# Patient Record
Sex: MALE | Race: WHITE | Employment: STUDENT | URBAN - METROPOLITAN AREA
[De-identification: names, ages, dates, MRNs, and addresses within clinical notes are randomized per-mention and may not be internally consistent; named-entity substitution may affect disease eponyms.]

---

## 2017-06-06 ENCOUNTER — LAB ENCOUNTER (OUTPATIENT)
Dept: LAB | Age: 22
End: 2017-06-06
Attending: FAMILY MEDICINE
Payer: COMMERCIAL

## 2017-06-06 ENCOUNTER — OFFICE VISIT (OUTPATIENT)
Dept: FAMILY MEDICINE CLINIC | Facility: CLINIC | Age: 22
End: 2017-06-06

## 2017-06-06 VITALS
HEART RATE: 72 BPM | BODY MASS INDEX: 27.77 KG/M2 | SYSTOLIC BLOOD PRESSURE: 118 MMHG | HEIGHT: 70.6 IN | DIASTOLIC BLOOD PRESSURE: 76 MMHG | RESPIRATION RATE: 14 BRPM | WEIGHT: 196.19 LBS | TEMPERATURE: 98 F

## 2017-06-06 DIAGNOSIS — R53.83 FATIGUE, UNSPECIFIED TYPE: Primary | ICD-10-CM

## 2017-06-06 DIAGNOSIS — R53.83 FATIGUE, UNSPECIFIED TYPE: ICD-10-CM

## 2017-06-06 PROCEDURE — 36415 COLL VENOUS BLD VENIPUNCTURE: CPT

## 2017-06-06 PROCEDURE — 82306 VITAMIN D 25 HYDROXY: CPT

## 2017-06-06 PROCEDURE — 99203 OFFICE O/P NEW LOW 30 MIN: CPT | Performed by: FAMILY MEDICINE

## 2017-06-06 PROCEDURE — 84443 ASSAY THYROID STIM HORMONE: CPT

## 2017-06-06 PROCEDURE — 80053 COMPREHEN METABOLIC PANEL: CPT

## 2017-06-06 PROCEDURE — 85025 COMPLETE CBC W/AUTO DIFF WBC: CPT

## 2017-06-06 NOTE — PROGRESS NOTES
Patient presents with:  Establish Care  Fatigue: random for the past year- some short term memory issues- brain feels cloudy at times- even though gets good rest  Physical: patient is fasting     HPI:   Kim Mejia is a 23year old male who presents Laterality Date   • Other       wisdom teeth extraction   • Other       nose - septoplasty     Family History   Problem Relation Age of Onset   • Seizure Disorder Father    • Lipids Father    • Hypertension Mother    • Migraines Mother    • Heart Attack Ma symmetric reflexes. Normal coordination and gait     ASSESSMENT AND PLAN:     Marquis Camp was seen in the office today:  had concerns including Establish Care;  Fatigue; and Physical.    1. Fatigue, unspecified type  New patient with this sort of chr

## 2017-11-15 ENCOUNTER — OFFICE VISIT (OUTPATIENT)
Dept: FAMILY MEDICINE CLINIC | Facility: CLINIC | Age: 22
End: 2017-11-15

## 2017-11-15 ENCOUNTER — APPOINTMENT (OUTPATIENT)
Dept: LAB | Age: 22
End: 2017-11-15
Attending: FAMILY MEDICINE
Payer: COMMERCIAL

## 2017-11-15 ENCOUNTER — TELEPHONE (OUTPATIENT)
Dept: FAMILY MEDICINE CLINIC | Facility: CLINIC | Age: 22
End: 2017-11-15

## 2017-11-15 VITALS
SYSTOLIC BLOOD PRESSURE: 130 MMHG | RESPIRATION RATE: 14 BRPM | TEMPERATURE: 98 F | HEIGHT: 70 IN | WEIGHT: 199 LBS | HEART RATE: 76 BPM | DIASTOLIC BLOOD PRESSURE: 72 MMHG | BODY MASS INDEX: 28.49 KG/M2

## 2017-11-15 DIAGNOSIS — G47.33 OBSTRUCTIVE SLEEP APNEA: ICD-10-CM

## 2017-11-15 DIAGNOSIS — F32.1 MODERATE MAJOR DEPRESSION, SINGLE EPISODE (HCC): ICD-10-CM

## 2017-11-15 DIAGNOSIS — R53.83 FATIGUE, UNSPECIFIED TYPE: ICD-10-CM

## 2017-11-15 DIAGNOSIS — R53.83 FATIGUE, UNSPECIFIED TYPE: Primary | ICD-10-CM

## 2017-11-15 PROCEDURE — 36415 COLL VENOUS BLD VENIPUNCTURE: CPT

## 2017-11-15 PROCEDURE — 84403 ASSAY OF TOTAL TESTOSTERONE: CPT

## 2017-11-15 PROCEDURE — 99214 OFFICE O/P EST MOD 30 MIN: CPT | Performed by: FAMILY MEDICINE

## 2017-11-15 RX ORDER — CHOLECALCIFEROL (VITAMIN D3) 50 MCG
TABLET ORAL
COMMUNITY
End: 2020-12-23

## 2017-11-15 NOTE — PROGRESS NOTES
Patient presents with:  Fatigue: Pt states gets 8-9 hours alseep and still wakes up tired. Pt states he has sleep apnea and stillwaking up at night.        HPI:   This is a 23year old male coming in for fatiguew all the time, hx NIKA, had septoplasty in [address] 06/06/2017 09:31 AM   AST 24 06/06/2017 09:31 AM   ALT 44 06/06/2017 09:31 AM   BILT 0.6 06/06/2017 09:31 AM   TP 7.5 06/06/2017 09:31 AM   ALB 4.2 06/06/2017 09:31 AM    06/06/2017 09:31 AM   K 4.4 06/06/2017 09:31 AM    06/06/2017 Neurological: He is alert and oriented to person, place, and time. He has normal strength. Skin: Skin is warm and dry. No rash noted. Psychiatric: He has a normal mood and affect.  His speech is normal and behavior is normal. Judgment and thought cont

## 2017-12-06 ENCOUNTER — APPOINTMENT (OUTPATIENT)
Dept: SLEEP CENTER | Facility: HOSPITAL | Age: 22
End: 2017-12-06
Attending: FAMILY MEDICINE
Payer: COMMERCIAL

## 2018-03-05 ENCOUNTER — TELEPHONE (OUTPATIENT)
Dept: FAMILY MEDICINE CLINIC | Facility: CLINIC | Age: 23
End: 2018-03-05

## 2018-03-05 ENCOUNTER — OFFICE VISIT (OUTPATIENT)
Dept: FAMILY MEDICINE CLINIC | Facility: CLINIC | Age: 23
End: 2018-03-05

## 2018-03-05 VITALS
BODY MASS INDEX: 29.63 KG/M2 | SYSTOLIC BLOOD PRESSURE: 120 MMHG | DIASTOLIC BLOOD PRESSURE: 60 MMHG | RESPIRATION RATE: 14 BRPM | TEMPERATURE: 98 F | HEIGHT: 69.8 IN | HEART RATE: 84 BPM | WEIGHT: 204.63 LBS

## 2018-03-05 DIAGNOSIS — R06.83 SNORING: ICD-10-CM

## 2018-03-05 DIAGNOSIS — S61.231A PUNCTURE WOUND OF LEFT INDEX FINGER: Primary | ICD-10-CM

## 2018-03-05 PROCEDURE — 99213 OFFICE O/P EST LOW 20 MIN: CPT | Performed by: PHYSICIAN ASSISTANT

## 2018-03-05 PROCEDURE — 90471 IMMUNIZATION ADMIN: CPT | Performed by: PHYSICIAN ASSISTANT

## 2018-03-05 PROCEDURE — 90715 TDAP VACCINE 7 YRS/> IM: CPT | Performed by: PHYSICIAN ASSISTANT

## 2018-03-05 NOTE — PROGRESS NOTES
CC:  Patient presents with:  Trauma (cardiovascular, musculoskeletal): Liftina a couch yesterday and staple went through left second finger. Requesting a tetanus vaccine. HPI: Dk Cruz presents for a puncture wound to the left distal index finger.  He w distress  HENT:  Head: Normocephalic, atraumatic  Ears: Normal external ears  Nose: No edema, bleeding, or rhinorrhea  Eyes: Pupils equal  Cardiovascular: Excellent peripheral perfusion  Pulmonary/Chest: No audible wheezing or labored breathing  Abdominal:

## 2019-11-20 ENCOUNTER — TELEPHONE (OUTPATIENT)
Dept: FAMILY MEDICINE CLINIC | Facility: CLINIC | Age: 24
End: 2019-11-20

## 2019-11-20 DIAGNOSIS — Z13.220 LIPID SCREENING: ICD-10-CM

## 2019-11-20 DIAGNOSIS — Z00.00 LABORATORY EXAM ORDERED AS PART OF ROUTINE GENERAL MEDICAL EXAMINATION: Primary | ICD-10-CM

## 2019-11-20 NOTE — TELEPHONE ENCOUNTER
Please enter lab orders for the patient's upcoming physical appointment. Please route back to . Physical scheduled:    Your appointments     Date & Time Appointment Department [facility])    Nov 26, 2019  8:30 AM CST Physical - Established wi

## 2019-11-26 ENCOUNTER — OFFICE VISIT (OUTPATIENT)
Dept: FAMILY MEDICINE CLINIC | Facility: CLINIC | Age: 24
End: 2019-11-26
Payer: COMMERCIAL

## 2019-11-26 VITALS
SYSTOLIC BLOOD PRESSURE: 108 MMHG | TEMPERATURE: 98 F | HEIGHT: 70.25 IN | DIASTOLIC BLOOD PRESSURE: 66 MMHG | RESPIRATION RATE: 14 BRPM | BODY MASS INDEX: 28.06 KG/M2 | WEIGHT: 196 LBS | HEART RATE: 64 BPM

## 2019-11-26 DIAGNOSIS — Z00.00 ANNUAL PHYSICAL EXAM: Primary | ICD-10-CM

## 2019-11-26 DIAGNOSIS — Z13.220 LIPID SCREENING: ICD-10-CM

## 2019-11-26 DIAGNOSIS — Z13.21 ENCOUNTER FOR VITAMIN DEFICIENCY SCREENING: ICD-10-CM

## 2019-11-26 PROCEDURE — 99395 PREV VISIT EST AGE 18-39: CPT | Performed by: FAMILY MEDICINE

## 2019-11-26 NOTE — PROGRESS NOTES
Patient presents with: Well Adult: Annual physical     HPI:   Roman Rubin is a 27year old male who presents for a complete physical exam. Feeling well, no concerns.       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    - VITAMIN B12  - VITAMIN D, 25-HYDROXY        RTC annually      Geeta Villegas M.D.   EMG 3  11/26/19

## 2020-02-24 ENCOUNTER — OFFICE VISIT (OUTPATIENT)
Dept: FAMILY MEDICINE CLINIC | Facility: CLINIC | Age: 25
End: 2020-02-24
Payer: COMMERCIAL

## 2020-02-24 VITALS
RESPIRATION RATE: 16 BRPM | HEIGHT: 71 IN | TEMPERATURE: 99 F | WEIGHT: 202.5 LBS | OXYGEN SATURATION: 99 % | SYSTOLIC BLOOD PRESSURE: 128 MMHG | DIASTOLIC BLOOD PRESSURE: 74 MMHG | BODY MASS INDEX: 28.35 KG/M2 | HEART RATE: 90 BPM

## 2020-02-24 DIAGNOSIS — J06.9 ACUTE URI: Primary | ICD-10-CM

## 2020-02-24 PROCEDURE — 99213 OFFICE O/P EST LOW 20 MIN: CPT | Performed by: NURSE PRACTITIONER

## 2020-02-25 NOTE — PATIENT INSTRUCTIONS
Gargle with warm salt water solution 3-5 times daily. Dissolve 1/2 teaspoon salt in half cup of warm tap water. Gargle and spit.      Try a premixed saline nasal spray, available over the counter, such as Harrison Nasal Spray (or generic equi

## 2020-02-25 NOTE — PROGRESS NOTES
Patient presents with:  Cough: x 1 day, weak, lightheaded, headach, chills       HPI:  Presents with 1 day history of chills (did not check temperatures), weakness, mild lightheadedness, cough with production of whitish colored sputum, mild sore throat, li dry. No rash noted. No erythema. No pallor. A/P:    Acute uri  (primary encounter diagnosis)- symptom profile consistent with influenza but no fevers, so I am hard pressed to call this influenza.  Discussed supportive care with fluids, rest. Note provid or in between full sinus washes.              All questions were answered and the patient understands the plan.     '

## 2020-09-30 ENCOUNTER — TELEPHONE (OUTPATIENT)
Dept: FAMILY MEDICINE CLINIC | Facility: CLINIC | Age: 25
End: 2020-09-30

## 2020-09-30 DIAGNOSIS — Z13.29 SCREENING FOR ENDOCRINE, METABOLIC AND IMMUNITY DISORDER: ICD-10-CM

## 2020-09-30 DIAGNOSIS — Z13.21 ENCOUNTER FOR VITAMIN DEFICIENCY SCREENING: ICD-10-CM

## 2020-09-30 DIAGNOSIS — Z13.220 SCREENING, LIPID: ICD-10-CM

## 2020-09-30 DIAGNOSIS — Z13.0 SCREENING, ANEMIA, DEFICIENCY, IRON: Primary | ICD-10-CM

## 2020-09-30 DIAGNOSIS — Z13.228 SCREENING FOR ENDOCRINE, METABOLIC AND IMMUNITY DISORDER: ICD-10-CM

## 2020-09-30 DIAGNOSIS — Z13.0 SCREENING FOR ENDOCRINE, METABOLIC AND IMMUNITY DISORDER: ICD-10-CM

## 2020-10-27 ENCOUNTER — LAB ENCOUNTER (OUTPATIENT)
Dept: LAB | Age: 25
End: 2020-10-27
Attending: FAMILY MEDICINE
Payer: COMMERCIAL

## 2020-10-27 DIAGNOSIS — Z13.0 SCREENING FOR ENDOCRINE, METABOLIC AND IMMUNITY DISORDER: ICD-10-CM

## 2020-10-27 DIAGNOSIS — Z13.228 SCREENING FOR ENDOCRINE, METABOLIC AND IMMUNITY DISORDER: ICD-10-CM

## 2020-10-27 DIAGNOSIS — Z13.220 SCREENING, LIPID: ICD-10-CM

## 2020-10-27 DIAGNOSIS — Z13.21 ENCOUNTER FOR VITAMIN DEFICIENCY SCREENING: ICD-10-CM

## 2020-10-27 DIAGNOSIS — Z13.29 SCREENING FOR ENDOCRINE, METABOLIC AND IMMUNITY DISORDER: ICD-10-CM

## 2020-10-27 DIAGNOSIS — Z13.0 SCREENING, ANEMIA, DEFICIENCY, IRON: ICD-10-CM

## 2020-10-27 PROCEDURE — 85025 COMPLETE CBC W/AUTO DIFF WBC: CPT

## 2020-10-27 PROCEDURE — 36415 COLL VENOUS BLD VENIPUNCTURE: CPT

## 2020-12-23 ENCOUNTER — OFFICE VISIT (OUTPATIENT)
Dept: FAMILY MEDICINE CLINIC | Facility: CLINIC | Age: 25
End: 2020-12-23
Payer: COMMERCIAL

## 2020-12-23 VITALS
DIASTOLIC BLOOD PRESSURE: 70 MMHG | SYSTOLIC BLOOD PRESSURE: 124 MMHG | WEIGHT: 208 LBS | HEIGHT: 70 IN | TEMPERATURE: 99 F | RESPIRATION RATE: 16 BRPM | HEART RATE: 82 BPM | BODY MASS INDEX: 29.78 KG/M2

## 2020-12-23 DIAGNOSIS — Z00.00 ANNUAL PHYSICAL EXAM: Primary | ICD-10-CM

## 2020-12-23 PROCEDURE — 3008F BODY MASS INDEX DOCD: CPT | Performed by: FAMILY MEDICINE

## 2020-12-23 PROCEDURE — 99395 PREV VISIT EST AGE 18-39: CPT | Performed by: FAMILY MEDICINE

## 2020-12-23 PROCEDURE — 3078F DIAST BP <80 MM HG: CPT | Performed by: FAMILY MEDICINE

## 2020-12-23 PROCEDURE — 3074F SYST BP LT 130 MM HG: CPT | Performed by: FAMILY MEDICINE

## 2020-12-23 NOTE — PROGRESS NOTES
Raven Chanel is a 23year old male who presents for a complete physical exam.   HPI:   Patient presents with:  Physical    His last annual assessment has been over 1 year: Annual Physical due on 11/26/2020       Pt complains of doing well, no new iss Heart Attack in his maternal grandfather; Hypertension in his mother; Lipids in his father; Migraines in his mother; Seizure Disorder in his father. He is not on any long-term medications. He has No Known Allergies.    He  reports that he has never sm normal, S2 normal, normal heart sounds and intact distal pulses. Exam reveals no gallop, no S3, no S4 and no friction rub. No murmur heard. Edema not present. Carotid bruit not present.   Pulmonary/Chest: Effort normal and breath sounds normal. No resp 10/22/2018   • HEP B 02/28/1997, 04/28/1997, 09/29/1997   • HIB 03/29/1997, 05/27/1997, 07/28/1997, 05/06/1998   • Hpv Virus Vaccine 9 Basilia Im 03/17/2016, 06/03/2016, 10/14/2016   • IPV 03/29/1997, 05/27/1997, 07/28/1997, 07/31/1998, 05/22/2002   • Influenz

## 2021-02-09 ENCOUNTER — TELEPHONE (OUTPATIENT)
Dept: FAMILY MEDICINE CLINIC | Facility: CLINIC | Age: 26
End: 2021-02-09

## 2021-02-15 ENCOUNTER — NURSE ONLY (OUTPATIENT)
Dept: FAMILY MEDICINE CLINIC | Facility: CLINIC | Age: 26
End: 2021-02-15
Payer: COMMERCIAL

## 2021-02-15 VITALS — TEMPERATURE: 98 F

## 2021-02-15 DIAGNOSIS — Z11.1 SCREENING FOR TUBERCULOSIS: Primary | ICD-10-CM

## 2021-02-15 PROCEDURE — 86580 TB INTRADERMAL TEST: CPT | Performed by: FAMILY MEDICINE

## 2021-02-15 NOTE — PROGRESS NOTES
Patient is here today for a TB test.  This is required for his employer. The TB test is administered into the left forearm. Patient is to return in 50 to 72 hours to have the test read.   Patient voiced understanding

## 2021-02-18 ENCOUNTER — NURSE ONLY (OUTPATIENT)
Dept: FAMILY MEDICINE CLINIC | Facility: CLINIC | Age: 26
End: 2021-02-18
Payer: COMMERCIAL

## 2021-02-18 VITALS — TEMPERATURE: 98 F

## 2021-02-18 DIAGNOSIS — Z11.1 SCREENING FOR TUBERCULOSIS: Primary | ICD-10-CM

## 2021-02-18 NOTE — PROGRESS NOTES
Patient is here for TB read back. The test was administered on 2/15/2021 and read today 2/18/21 within the window of 48 to 72 hours. The test is read as 0 mm negative.   Patient is given a copy of his immunizations

## 2021-03-15 DIAGNOSIS — Z23 NEED FOR VACCINATION: ICD-10-CM

## 2021-04-10 ENCOUNTER — MOBILE ENCOUNTER (OUTPATIENT)
Dept: FAMILY MEDICINE CLINIC | Facility: CLINIC | Age: 26
End: 2021-04-10

## 2021-04-11 NOTE — PROGRESS NOTES
Patient started having symptoms on Wednesday, 3 days ago. Tested positive at work on Thursday for COVID-19. He had several exposures while wearing his mask and is concerned.  He has minimal symptoms at this time including a loss of smell and taste and mild

## 2021-04-13 ENCOUNTER — TELEPHONE (OUTPATIENT)
Dept: FAMILY MEDICINE CLINIC | Facility: CLINIC | Age: 26
End: 2021-04-13

## 2021-04-13 NOTE — TELEPHONE ENCOUNTER
Called nasima talked to patient told him this would not be a good idea as they could keep reinfecting each other so he will stay away for now.

## 2021-04-13 NOTE — TELEPHONE ENCOUNTER
Pt tested positive on 4/8/21 and girlfriend on 04/13/21. Pt would like to know if he is able to quarantine with her so they don't expose her father? Please advise.

## 2021-04-19 ENCOUNTER — TELEPHONE (OUTPATIENT)
Dept: FAMILY MEDICINE CLINIC | Facility: CLINIC | Age: 26
End: 2021-04-19

## 2021-04-19 NOTE — TELEPHONE ENCOUNTER
Pt states he was diagnosed with COVID on 04/08/21 and he is still having a cough and a little congestion. Pt wants to know if he should quarantine pass his 14 day mahendra or is there medication he can take to get rid of his cough.  Advised on video visit christian gallegos

## 2021-04-21 ENCOUNTER — TELEMEDICINE (OUTPATIENT)
Dept: FAMILY MEDICINE CLINIC | Facility: CLINIC | Age: 26
End: 2021-04-21

## 2021-04-21 DIAGNOSIS — U07.1 COVID-19 VIRUS INFECTION: Primary | ICD-10-CM

## 2021-04-21 DIAGNOSIS — R05.9 COUGH: ICD-10-CM

## 2021-04-21 PROCEDURE — 99213 OFFICE O/P EST LOW 20 MIN: CPT | Performed by: NURSE PRACTITIONER

## 2021-04-21 RX ORDER — ALBUTEROL SULFATE 90 UG/1
2 AEROSOL, METERED RESPIRATORY (INHALATION) EVERY 4 HOURS PRN
Qty: 1 INHALER | Refills: 1 | Status: SHIPPED | OUTPATIENT
Start: 2021-04-21 | End: 2021-10-05

## 2021-04-21 NOTE — PATIENT INSTRUCTIONS
Use inhaler (as instructed below) as needed for coughing or shortness of breath. Be sure to space doses at least 4 hours apart. Inhaler should be primed before first use only by pressing inhaler down once or twice without inhaler your mouth.

## 2021-04-21 NOTE — PROGRESS NOTES
Patient presents with:  Covid: covid follow started 4/8      This visit was conducted using Telemedicine with live, interactive video and audio.     Patient understands and accepts financial responsibility for any deductible, co-insurance and/or co-pays ass sentences. No need to pause speaking to take breaths, no deep/exaggerated breaths or coughing noted during conversation.        Skin: warm and pink w/o flushing or obvious diaphoresis     Psych: pleasant and cooperative on video, speech pattern normal. HFA (PROAIR HFA) 108 (90 Base) MCG/ACT Inhalation Aero Soln 1 Inhaler 1     Sig: Inhale 2 puffs into the lungs every 4 (four) hours as needed for Wheezing or Shortness of Breath (coughing). Imaging & Consults:  None    No follow-ups on file.   Patient

## 2021-04-22 ENCOUNTER — TELEPHONE (OUTPATIENT)
Dept: FAMILY MEDICINE CLINIC | Facility: CLINIC | Age: 26
End: 2021-04-22

## 2021-04-22 NOTE — TELEPHONE ENCOUNTER
I would say up to them. I typically use a 10-14 day range, so as long as they are having no symptoms should be fine.

## 2021-04-22 NOTE — TELEPHONE ENCOUNTER
Called and talked to patient he is almost 14 days clear and the girl friend is 13 days they will discuss this

## 2021-04-22 NOTE — TELEPHONE ENCOUNTER
Patient is calling he wants to know if it is safe to continue to see girl friend now with out masking they are both past the ten days both had Covid.

## 2021-08-16 ENCOUNTER — TELEPHONE (OUTPATIENT)
Dept: FAMILY MEDICINE CLINIC | Facility: CLINIC | Age: 26
End: 2021-08-16

## 2021-08-16 NOTE — TELEPHONE ENCOUNTER
Spoke to pt and explained he will need to schedule a video visit. Pt said it was not urgent and will call back to schedule.

## 2022-02-07 ENCOUNTER — OFFICE VISIT (OUTPATIENT)
Dept: FAMILY MEDICINE CLINIC | Facility: CLINIC | Age: 27
End: 2022-02-07
Payer: COMMERCIAL

## 2022-02-07 VITALS
SYSTOLIC BLOOD PRESSURE: 130 MMHG | HEART RATE: 82 BPM | BODY MASS INDEX: 30.1 KG/M2 | DIASTOLIC BLOOD PRESSURE: 70 MMHG | WEIGHT: 215 LBS | HEIGHT: 71 IN | RESPIRATION RATE: 18 BRPM

## 2022-02-07 DIAGNOSIS — K64.0 GRADE I HEMORRHOIDS: ICD-10-CM

## 2022-02-07 DIAGNOSIS — M72.2 PLANTAR FASCIITIS: Primary | ICD-10-CM

## 2022-02-07 PROCEDURE — 3075F SYST BP GE 130 - 139MM HG: CPT | Performed by: FAMILY MEDICINE

## 2022-02-07 PROCEDURE — 3008F BODY MASS INDEX DOCD: CPT | Performed by: FAMILY MEDICINE

## 2022-02-07 PROCEDURE — 17110 DESTRUCTION B9 LES UP TO 14: CPT | Performed by: FAMILY MEDICINE

## 2022-02-07 PROCEDURE — 99213 OFFICE O/P EST LOW 20 MIN: CPT | Performed by: FAMILY MEDICINE

## 2022-02-07 PROCEDURE — 3078F DIAST BP <80 MM HG: CPT | Performed by: FAMILY MEDICINE

## 2022-04-11 ENCOUNTER — TELEPHONE (OUTPATIENT)
Dept: FAMILY MEDICINE CLINIC | Facility: CLINIC | Age: 27
End: 2022-04-11

## 2022-11-14 ENCOUNTER — TELEPHONE (OUTPATIENT)
Dept: FAMILY MEDICINE CLINIC | Facility: CLINIC | Age: 27
End: 2022-11-14

## 2022-11-14 DIAGNOSIS — Z00.00 LABORATORY EXAMINATION ORDERED AS PART OF A ROUTINE GENERAL MEDICAL EXAMINATION: Primary | ICD-10-CM

## 2022-11-14 NOTE — TELEPHONE ENCOUNTER
Please enter lab orders for the patient's upcoming physical appointment. Physical scheduled: Your appointments     Date & Time Appointment Department [facility])    Dec 23, 2022 10:00 AM CST Adult Physical with Shelly Dykes  [address]  ([address])    PLEASE NOTE - Most insurances allow a Complete Physical once every 366 days. Please schedule accordingly. Please arrive 15 minutes prior to your scheduled appointment. Please also bring your Insurance card, Photo ID, and your medication bottles or a list of your current medication. If you no longer require this appointment, please contact your physician office to cancel. [place] [place] [address] [phone]         [facility])     The patient has been notified to complete fasting labs prior to their physical appointment.

## 2022-11-14 NOTE — TELEPHONE ENCOUNTER
1. Laboratory examination ordered as part of a routine general medical examination (Primary)  -     Comp Metabolic Panel (14); Future; Expected date: 11/14/2022  -     Lipid Panel; Future; Expected date: 11/14/2022  -     CBC, Platelet; No Differential; Future; Expected date: 11/14/2022  -     TSH W Reflex To Free T4; Future; Expected date: 11/14/2022       OK to notify.  Thanks, Cyril Chu MD

## 2022-12-27 ENCOUNTER — LAB ENCOUNTER (OUTPATIENT)
Dept: LAB | Facility: HOSPITAL | Age: 27
End: 2022-12-27
Attending: FAMILY MEDICINE
Payer: COMMERCIAL

## 2022-12-27 DIAGNOSIS — Z00.00 LABORATORY EXAMINATION ORDERED AS PART OF A ROUTINE GENERAL MEDICAL EXAMINATION: ICD-10-CM

## 2022-12-27 LAB
CHOLEST SERPL-MCNC: 177 MG/DL (ref ?–200)
ERYTHROCYTE [DISTWIDTH] IN BLOOD BY AUTOMATED COUNT: 12.4 %
FASTING PATIENT LIPID ANSWER: YES
HCT VFR BLD AUTO: 47.3 %
HDLC SERPL-MCNC: 45 MG/DL (ref 40–59)
HGB BLD-MCNC: 16 G/DL
LDLC SERPL CALC-MCNC: 114 MG/DL (ref ?–100)
MCH RBC QN AUTO: 28.6 PG (ref 26–34)
MCHC RBC AUTO-ENTMCNC: 33.8 G/DL (ref 31–37)
MCV RBC AUTO: 84.5 FL
NONHDLC SERPL-MCNC: 132 MG/DL (ref ?–130)
PLATELET # BLD AUTO: 232 10(3)UL (ref 150–450)
RBC # BLD AUTO: 5.6 X10(6)UL
TRIGL SERPL-MCNC: 101 MG/DL (ref 30–149)
TSI SER-ACNC: 1.72 MIU/ML (ref 0.36–3.74)
VLDLC SERPL CALC-MCNC: 17 MG/DL (ref 0–30)
WBC # BLD AUTO: 6.4 X10(3) UL (ref 4–11)

## 2022-12-27 PROCEDURE — 85027 COMPLETE CBC AUTOMATED: CPT

## 2022-12-27 PROCEDURE — 84443 ASSAY THYROID STIM HORMONE: CPT

## 2022-12-27 PROCEDURE — 36415 COLL VENOUS BLD VENIPUNCTURE: CPT

## 2022-12-27 PROCEDURE — 80061 LIPID PANEL: CPT

## 2023-02-08 ENCOUNTER — OFFICE VISIT (OUTPATIENT)
Dept: FAMILY MEDICINE CLINIC | Facility: CLINIC | Age: 28
End: 2023-02-08
Payer: COMMERCIAL

## 2023-02-08 VITALS
DIASTOLIC BLOOD PRESSURE: 80 MMHG | WEIGHT: 217 LBS | SYSTOLIC BLOOD PRESSURE: 130 MMHG | HEIGHT: 71 IN | BODY MASS INDEX: 30.38 KG/M2 | RESPIRATION RATE: 16 BRPM | HEART RATE: 80 BPM

## 2023-02-08 DIAGNOSIS — Z00.00 ANNUAL PHYSICAL EXAM: Primary | ICD-10-CM

## 2023-02-08 DIAGNOSIS — R06.83 SNORING: ICD-10-CM

## 2023-02-08 PROCEDURE — 3079F DIAST BP 80-89 MM HG: CPT | Performed by: FAMILY MEDICINE

## 2023-02-08 PROCEDURE — 99395 PREV VISIT EST AGE 18-39: CPT | Performed by: FAMILY MEDICINE

## 2023-02-08 PROCEDURE — 3075F SYST BP GE 130 - 139MM HG: CPT | Performed by: FAMILY MEDICINE

## 2023-02-08 PROCEDURE — 3008F BODY MASS INDEX DOCD: CPT | Performed by: FAMILY MEDICINE

## 2023-09-14 ENCOUNTER — TELEPHONE (OUTPATIENT)
Dept: FAMILY MEDICINE CLINIC | Facility: CLINIC | Age: 28
End: 2023-09-14

## 2023-09-14 DIAGNOSIS — Z00.00 LABORATORY EXAMINATION ORDERED AS PART OF A ROUTINE GENERAL MEDICAL EXAMINATION: ICD-10-CM

## 2023-09-14 DIAGNOSIS — Z13.1 SCREENING FOR DIABETES MELLITUS: Primary | ICD-10-CM

## 2023-09-14 DIAGNOSIS — Z13.6 SCREENING FOR CARDIOVASCULAR CONDITION: ICD-10-CM

## 2023-09-14 DIAGNOSIS — Z79.899 LONG-TERM USE OF HIGH-RISK MEDICATION: ICD-10-CM

## 2023-09-14 DIAGNOSIS — Z13.0 SCREENING FOR IRON DEFICIENCY ANEMIA: ICD-10-CM

## 2023-09-14 DIAGNOSIS — Z11.59 ENCOUNTER FOR HEPATITIS C SCREENING TEST FOR LOW RISK PATIENT: ICD-10-CM

## 2023-09-14 DIAGNOSIS — Z13.29 SCREENING FOR THYROID DISORDER: ICD-10-CM

## 2023-09-14 DIAGNOSIS — E55.9 VITAMIN D DEFICIENCY: ICD-10-CM

## 2023-09-22 ENCOUNTER — LAB ENCOUNTER (OUTPATIENT)
Dept: LAB | Age: 28
End: 2023-09-22
Attending: RADIOLOGY
Payer: COMMERCIAL

## 2023-09-22 DIAGNOSIS — Z11.59 ENCOUNTER FOR HEPATITIS C SCREENING TEST FOR LOW RISK PATIENT: ICD-10-CM

## 2023-09-22 DIAGNOSIS — Z13.6 SCREENING FOR CARDIOVASCULAR CONDITION: ICD-10-CM

## 2023-09-22 DIAGNOSIS — Z79.899 LONG-TERM USE OF HIGH-RISK MEDICATION: ICD-10-CM

## 2023-09-22 DIAGNOSIS — Z13.29 SCREENING FOR THYROID DISORDER: ICD-10-CM

## 2023-09-22 DIAGNOSIS — Z13.1 SCREENING FOR DIABETES MELLITUS: ICD-10-CM

## 2023-09-22 DIAGNOSIS — Z00.00 LABORATORY EXAMINATION ORDERED AS PART OF A ROUTINE GENERAL MEDICAL EXAMINATION: ICD-10-CM

## 2023-09-22 DIAGNOSIS — Z13.0 SCREENING FOR IRON DEFICIENCY ANEMIA: ICD-10-CM

## 2023-09-22 DIAGNOSIS — E55.9 VITAMIN D DEFICIENCY: ICD-10-CM

## 2023-09-22 LAB
ALBUMIN SERPL-MCNC: 4.3 G/DL (ref 3.4–5)
ALBUMIN/GLOB SERPL: 1.3 {RATIO} (ref 1–2)
ALP LIVER SERPL-CCNC: 89 U/L
ALT SERPL-CCNC: 53 U/L
ANION GAP SERPL CALC-SCNC: 8 MMOL/L (ref 0–18)
AST SERPL-CCNC: 34 U/L (ref 15–37)
BASOPHILS # BLD AUTO: 0.05 X10(3) UL (ref 0–0.2)
BASOPHILS NFR BLD AUTO: 0.8 %
BILIRUB SERPL-MCNC: 0.6 MG/DL (ref 0.1–2)
BUN BLD-MCNC: 12 MG/DL (ref 7–18)
CALCIUM BLD-MCNC: 9.2 MG/DL (ref 8.5–10.1)
CHLORIDE SERPL-SCNC: 106 MMOL/L (ref 98–112)
CHOLEST SERPL-MCNC: 156 MG/DL (ref ?–200)
CO2 SERPL-SCNC: 25 MMOL/L (ref 21–32)
CREAT BLD-MCNC: 1.27 MG/DL
EGFRCR SERPLBLD CKD-EPI 2021: 80 ML/MIN/1.73M2 (ref 60–?)
EOSINOPHIL # BLD AUTO: 0.07 X10(3) UL (ref 0–0.7)
EOSINOPHIL NFR BLD AUTO: 1.2 %
ERYTHROCYTE [DISTWIDTH] IN BLOOD BY AUTOMATED COUNT: 12.9 %
FASTING PATIENT LIPID ANSWER: YES
FASTING STATUS PATIENT QL REPORTED: YES
GLOBULIN PLAS-MCNC: 3.2 G/DL (ref 2.8–4.4)
GLUCOSE BLD-MCNC: 96 MG/DL (ref 70–99)
HCT VFR BLD AUTO: 46.4 %
HCV AB SERPL QL IA: NONREACTIVE
HDLC SERPL-MCNC: 42 MG/DL (ref 40–59)
HGB BLD-MCNC: 15.6 G/DL
IMM GRANULOCYTES # BLD AUTO: 0.04 X10(3) UL (ref 0–1)
IMM GRANULOCYTES NFR BLD: 0.7 %
LDLC SERPL CALC-MCNC: 98 MG/DL (ref ?–100)
LYMPHOCYTES # BLD AUTO: 2.03 X10(3) UL (ref 1–4)
LYMPHOCYTES NFR BLD AUTO: 34.5 %
MCH RBC QN AUTO: 28.1 PG (ref 26–34)
MCHC RBC AUTO-ENTMCNC: 33.6 G/DL (ref 31–37)
MCV RBC AUTO: 83.6 FL
MONOCYTES # BLD AUTO: 0.56 X10(3) UL (ref 0.1–1)
MONOCYTES NFR BLD AUTO: 9.5 %
NEUTROPHILS # BLD AUTO: 3.14 X10 (3) UL (ref 1.5–7.7)
NEUTROPHILS # BLD AUTO: 3.14 X10(3) UL (ref 1.5–7.7)
NEUTROPHILS NFR BLD AUTO: 53.3 %
NONHDLC SERPL-MCNC: 114 MG/DL (ref ?–130)
OSMOLALITY SERPL CALC.SUM OF ELEC: 288 MOSM/KG (ref 275–295)
PLATELET # BLD AUTO: 275 10(3)UL (ref 150–450)
POTASSIUM SERPL-SCNC: 4.4 MMOL/L (ref 3.5–5.1)
PROT SERPL-MCNC: 7.5 G/DL (ref 6.4–8.2)
RBC # BLD AUTO: 5.55 X10(6)UL
SODIUM SERPL-SCNC: 139 MMOL/L (ref 136–145)
TRIGL SERPL-MCNC: 83 MG/DL (ref 30–149)
TSI SER-ACNC: 1.3 MIU/ML (ref 0.36–3.74)
VIT D+METAB SERPL-MCNC: 25 NG/ML (ref 30–100)
VLDLC SERPL CALC-MCNC: 14 MG/DL (ref 0–30)
WBC # BLD AUTO: 5.9 X10(3) UL (ref 4–11)

## 2023-09-22 PROCEDURE — 82306 VITAMIN D 25 HYDROXY: CPT | Performed by: FAMILY MEDICINE

## 2023-09-22 PROCEDURE — 83036 HEMOGLOBIN GLYCOSYLATED A1C: CPT | Performed by: FAMILY MEDICINE

## 2023-09-22 PROCEDURE — 80061 LIPID PANEL: CPT | Performed by: FAMILY MEDICINE

## 2023-09-22 PROCEDURE — 86803 HEPATITIS C AB TEST: CPT | Performed by: FAMILY MEDICINE

## 2023-09-22 PROCEDURE — 80050 GENERAL HEALTH PANEL: CPT | Performed by: FAMILY MEDICINE

## 2023-09-23 LAB
EST. AVERAGE GLUCOSE BLD GHB EST-MCNC: 103 MG/DL (ref 68–126)
HBA1C MFR BLD: 5.2 % (ref ?–5.7)

## 2025-02-03 ENCOUNTER — TELEPHONE (OUTPATIENT)
Dept: FAMILY MEDICINE CLINIC | Facility: CLINIC | Age: 30
End: 2025-02-03

## 2025-02-03 DIAGNOSIS — Z00.00 LABORATORY EXAM ORDERED AS PART OF ROUTINE GENERAL MEDICAL EXAMINATION: Primary | ICD-10-CM

## 2025-02-03 NOTE — TELEPHONE ENCOUNTER
Please enter lab orders for the patient's upcoming physical appointment on 2/17/25.     Physical scheduled:   Your appointments       Date & Time Appointment Department ([place])    Feb 17, 2025 10:30 AM CST Physical - Established with Rusty Padron MD [facility] ([facility])              [facility]  [address] [place] [id number]  [phone]           Preferred lab: [facility] LAB ([facility])     The patient has been notified to complete fasting labs prior to their physical appointment.

## 2025-02-03 NOTE — TELEPHONE ENCOUNTER
A1C is in, T is not.  Often additional charges to check testosterone levels as this is not part of the usual screening labs .  Sleep study can be discussed at visit

## 2025-02-03 NOTE — TELEPHONE ENCOUNTER
Pt called made a physical appt for 2/17/25. Lab orders sent to Dr. Padron. Pt wants to know if he can get an order for a sleep study. Would also like to get A1C and testosterone added to labs. Please let pt know if possible to get now or if he needs to see the dr first.

## 2025-02-03 NOTE — TELEPHONE ENCOUNTER
Spoke with patient. All information relayed. Patient verbalized understanding. No further concerns.

## 2025-02-17 NOTE — PROGRESS NOTES
Chief Complaint   Patient presents with    Physical     Patient here for physical       HPI:   Juanjo Johnson is a 30 year old male who presents for a complete physical exam.     Last colonoscopy:  N/a - screening at 48yo   Last PSA:  N/a - screening at 52   Immunizations: TDaP 2018.      Snoring - has picked up.  He recalls h/o 2 sleep study years ago.  H/o septoplasty as well.  Loud snoring - does not appear to stop breathing.  Sometimes wakes up feeling out of breath.  Sometimes has energy to face the day, sometimes not as much.    Depends on caffeine through the day sometimes.     GI - notices excessive stool.  Has a tendency to be harder stool.  Type 1 bristol stool chart.  Sensation of incomplete voiding.      Wt Readings from Last 6 Encounters:   02/17/25 226 lb (102.5 kg)   02/08/23 217 lb (98.4 kg)   02/07/22 215 lb (97.5 kg)   11/16/21 218 lb (98.9 kg)   10/05/21 212 lb (96.2 kg)   08/27/21 208 lb (94.3 kg)     Body mass index is 31.52 kg/m².     Chemistry Labs:   Lab Results   Component Value Date/Time    GLU 96 09/22/2023 09:46 AM     09/22/2023 09:46 AM    K 4.4 09/22/2023 09:46 AM     09/22/2023 09:46 AM    CO2 25.0 09/22/2023 09:46 AM    CREATSERUM 1.27 09/22/2023 09:46 AM    CA 9.2 09/22/2023 09:46 AM    ALB 4.3 09/22/2023 09:46 AM    TP 7.5 09/22/2023 09:46 AM    ALKPHO 89 09/22/2023 09:46 AM    AST 34 09/22/2023 09:46 AM    ALT 53 09/22/2023 09:46 AM    BILT 0.6 09/22/2023 09:46 AM          Cholesterol  (most recent labs)   Lab Results   Component Value Date/Time    CHOLEST 156 09/22/2023 09:46 AM    HDL 42 09/22/2023 09:46 AM    LDL 98 09/22/2023 09:46 AM    TRIG 83 09/22/2023 09:46 AM      No results found for: \"PSA\"      No current outpatient medications on file.      Past Medical History:    Attention deficit hyperactivity disorder (ADHD)      Past Surgical History:   Procedure Laterality Date    Other      wisdom teeth extraction    Other      nose - septoplasty      Family  History   Problem Relation Age of Onset    Seizure Disorder Father     Lipids Father     Hypertension Mother     Migraines Mother     Heart Attack Maternal Grandfather       Social History:  Social History     Socioeconomic History    Marital status: Single   Occupational History    Occupation: Car wash    Tobacco Use    Smoking status: Never    Smokeless tobacco: Never   Vaping Use    Vaping status: Never Used   Substance and Sexual Activity    Alcohol use: Yes     Alcohol/week: 0.0 standard drinks of alcohol     Comment: red wine, occ    Drug use: No    Sexual activity: Never   Other Topics Concern    Caffeine Concern Yes     Comment: 1-2 cup coffee daily    Exercise Yes     Comment: 3times per week -boxing and weight lifting    Seat Belt Yes    Sleep Concern Yes     Comment: feels tired throughout the day,snores      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    : no - girlfriend. Children: no.   Exercise: in the evenings.  1-2 days a week, sometimes more 3-4  Diet: eggs in the AM.  Pasta, bread.       REVIEW OF SYSTEMS:     All systems reviewed, negative other than noted above.    EXAM:   /70   Pulse 81   Resp 16   Ht 5' 11\" (1.803 m)   Wt 226 lb (102.5 kg)   SpO2 98%   BMI 31.52 kg/m²   Body mass index is 31.52 kg/m².     General appearance: alert, appears stated age and cooperative  Eyes: conjunctivae/corneas clear. PERRL, EOM's intact.   Ears: normal TM's and external ear canals both ears  Neck: no adenopathy, no JVD, supple, symmetrical, trachea midline and thyroid not enlarged, symmetric, no tenderness/mass/nodules  Lungs: clear to auscultation bilaterally  Heart: S1, S2 normal, no murmur, click, rub or gallop, regular rate and rhythm  Abdomen: soft, non-tender; bowel sounds normal; no masses,  no organomegaly  Extremities: extremities normal, atraumatic, no cyanosis or edema  Pulses: 2+ and symmetric  Neurologic: Alert and oriented X 3, normal strength and tone. Normal symmetric reflexes. Normal  coordination and gait     ASSESSMENT AND PLAN:     Juanjo Johnson was seen in the office today:  had concerns including Physical (Patient here for physical ).    1. Annual physical exam  Overall fair  Will check labs  Main issues are detailed below with the sleep and the GI issues      2. Snoring  Likely multifactorial.  Weight gain contributing, as well as some anatomic things  History of 2 sleep studies  History of using oral devices  Need to see sleep specialist for ongoing workup  Getting weight down should help.  Healthy food choices should also help the GI issues he is having  - Sleep Medicine - Adelfo Higgins [facility] Pul          Rusty Padron M.D.   EMG 3  02/17/25        Note to patient: The 21 Century Cures Act makes medical notes like these available to patients in the interest of transparency. However, be advised this is a medical document. It is intended as peer to peer communication. It is written in medical language and may contain abbreviations or verbiage that are unfamiliar. It may appear blunt or direct. Medical documents are intended to carry relevant information, facts as evident, and the clinical opinion of the practitioner.

## 2025-02-20 NOTE — TELEPHONE ENCOUNTER
Prescription approved per [facility] Refill Protocol.  Bharti HUNT RN     Pt is calling for a phone number for his pulm referral

## (undated) NOTE — MR AVS SNAPSHOT
[address]  [facility],  [address] [facility] [phone]               Thank you for choosing us for your health care visit with Matt Castorena MD.  We are glad to serve you and happy to provide you with this s Tretinoin 0.1 % Crea   Apply topically to arm and back nightly   Commonly known as:  RETIN-A                   MyChart               Educational Information     Healthy Diet and Regular Exercise  [employer] [facility] [address] [facility] [place] for making healthy fo

## (undated) NOTE — LETTER
Date: 4/21/2021    Patient Name: Faye Faria          To Whom it may concern: This letter has been written at the patient's request. The above patient has been evaluated by the [facility] for treatment of a medical condition.     Melanie Bacon

## (undated) NOTE — LETTER
Date: 2/24/2020    Patient Name: Jennifer Parsons          To Whom it may concern: This letter has been written at the patient's request. The above patient was seen at the [facility] for treatment of a medical condition.     This patient